# Patient Record
(demographics unavailable — no encounter records)

---

## 2018-01-22 NOTE — EKG
Test Reason : 

Blood Pressure : ***/*** mmHG

Vent. Rate : 091 BPM     Atrial Rate : 091 BPM

   P-R Int : 162 ms          QRS Dur : 070 ms

    QT Int : 360 ms       P-R-T Axes : 038 -28 033 degrees

   QTc Int : 442 ms

 

Normal sinus rhythm

Anterior infarct (cited on or before 18-APR-2016)

Abnormal ECG

When compared with ECG of 18-APR-2016 12:47,

No significant change was found

Confirmed by CAROL DALE, DR. S. ([id number]) on 1/22/2018 8:18:17 PM

 

Referred By:  KIKA           Confirmed By:DR. MAURICE MEDINA MD

## 2018-01-25 NOTE — OP
DATE OF PROCEDURE:  01/25/2018

 

OR:  OR #11.

 

WOUND TYPE:  Type 1 wound.

 

SURGEON:  Carlos Dos Santos M.D.

 

ASSISTANT:  Carlos Dixon PA-C.

 

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

 

PROCEDURE:

1.  C4-C5, C5-C6, C6-C7 anterior diskectomies, decompression of the spinal cord and nerve roots with 
preparation of the endplates.

2.  Placement of interbody spacers C4-C5, C5-C6, C6-C7 packed with graft for arthrodesis.

3.  Anterior cervical plate and screw fixation C4-C5, C5-C6 and C6-C7 and again placement of interbod
y spacers at C4-C5, C5-C6, C6-C7.

4.  Use of operative microscope for microdissection.

 

PROCEDURE:  After informed consent was obtained from the patient, the patient brought to OR 11.  Prop
er patient pause and identification was carried out.  She was placed under excellent general endotrac
heal anesthesia and a right oblique anterior incision was drawn out.  This region was sterilely clean
sed, prepared, and draped.  Proper patient pause and identification was carried out.  The wound was t
hen opened with a combination of sharp, monopolar and blunt dissection, and we proceeded lateral to t
he larynx and pharynx and medial to the right carotid sheath and identified the prevertebral layer of
 deep cervical fascia.  We then placed retractors after exposure of the C4, C5, C6, C7 segments and t
he microscope was brought into place.  Distraction at C4-C5 then occurred and diskectomy occurred.  W
e had excellent decompression of the common dural tube and nerve roots.  An interbody spacer of appro
priate dimension was placed for arthrodesis at C4-C5.  We then distracted the C5-C6 segment.  A diske
ctomy was performed there.  We were satisfied with decompression C5-C6, the neural elements an interb
[place] spacer again was packed with graft and place at C5-C6 for arthrodesis.  Finally, distraction at C
6-C7 then occurred and a diskectomy was performed at C6-C7 with satisfactory decompression and again 
we were pleased following placement of interbody spacer with the alignment of the spine and the decom
pression.  This space was also packed with graft for arthrodesis after endplate preparation.  The [employer]
roscope was then removed and anterior cervical plate and screw fixation at C4, C5, C5, C7 then occurr
ed.  We were pleased with our construct.  Final tightening occurred.  Copious irrigation as well occu
rred as did maximizing hemostasis.  The wound was then closed in anatomic layers over a drain.  The p
atient then emerged from anesthesia.

## 2018-01-26 NOTE — PRG
DATE OF SERVICE:  01/26/2018

 

SUBJECTIVE:  Ms. Cleary is postoperative day #1 from multilevel anterior cervical diskectomy and fus
ion.  She states her arms are certainly feeling better with also improved function in her hands alrea
dy.  Her wound is healing well and her drain output has been approximately 30 mL and we will remove h
er drain.  She has good strength in her upper and lower extremities.  We will plan for dismissal.  We
 went over both intraoperative and postoperative issues.  I am very pleased with her outcome.  We victor hugo
l plan for dismissal.